# Patient Record
Sex: FEMALE | Race: BLACK OR AFRICAN AMERICAN | NOT HISPANIC OR LATINO | Employment: UNEMPLOYED | URBAN - METROPOLITAN AREA
[De-identification: names, ages, dates, MRNs, and addresses within clinical notes are randomized per-mention and may not be internally consistent; named-entity substitution may affect disease eponyms.]

---
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
## 2020-03-23 ENCOUNTER — DOCUMENTATION (OUTPATIENT)
Dept: PHYSICAL THERAPY | Facility: HOSPITAL | Age: 20
End: 2020-03-23

## 2020-03-23 DIAGNOSIS — G89.29 CHRONIC PAIN OF RIGHT KNEE: Primary | ICD-10-CM

## 2020-03-23 DIAGNOSIS — S83.8X1S SPRAIN OF OTHER LIGAMENT OF RIGHT KNEE, SEQUELA: ICD-10-CM

## 2020-03-23 DIAGNOSIS — M25.561 CHRONIC PAIN OF RIGHT KNEE: Primary | ICD-10-CM

## 2020-03-23 NOTE — THERAPY DISCHARGE NOTE
Outpatient Physical Therapy Discharge Summary         Patient Name: Hieu Ibarra  : 2000  MRN: [id number]    Today's Date: 3/23/2020    Visit Dx:    ICD-10-CM ICD-9-CM   1. Chronic pain of right knee M25.561 719.46    G89.29 338.29   2. Sprain of other ligament of right knee, sequela S83.8X1S 905.7       PT OP Goals     Row Name 20 0900          PT Short Term Goals    STG Date to Achieve  19  -GJ     STG 1  Pt will demonstrate equal B heel strike using B axillary crutches with appropriate posture.  -GJ     STG 1 Progress  Met  -GJ     STG 2  Pt will tolerate quad set x 10 with appropriate quadriceps activation.  -GJ     STG 2 Progress  Met  -GJ     STG 3  Pt will demonstrate improved AROM R knee extension from lacking 50 deg to lacking 35 deg or better.  -     STG 3 Progress  Met  -GJ        Long Term Goals    LTG Date to Achieve  19  -GJ     LTG 1  Pt will demonstrate improved AROM of R knee from lacking 50 deg to lacking 10 deg or better.  -GJ     LTG 1 Progress  Met  -GJ     LTG 2  Pt will demonstrate ability to perform SAQ without inc pain.  -GJ     LTG 2 Progress  Met  -GJ     LTG 3  Pt will demonstrate normalized gait pattern with single axillary crutch or independently.  -     LTG 3 Progress  Met  -GJ     LTG 4  Pt demonstrates understanding with advanced HEP to allow her to continue strengthening as she leaves for college.  -     LTG 4 Progress  Met  -GJ       User Key  (r) = Recorded By, (t) = Taken By, (c) = Cosigned By    Initials Name Provider Type    Tony Orourke, PT Physical Therapist          OP PT Discharge Summary  Date of Discharge: 20  Reason for Discharge: All goals achieved, Independent  Outcomes Achieved: Able to achieve all goals within established timeline  Discharge Destination: Home with home program  Discharge Instructions/Additional Comments: Ms. Ibarra attended 7 sessions of physical therapy from 2019-2019 for her R knee pain.   She reports very low pain levels, continues with difficulty standing from a deep squat.  She is independent with her HEP, has met all of her functional goals and verbalizes a good understanding of her condition.  Ms. Ibarra is discharged from outpatient physical therapy to an independent program.       Time Calculation:                    Tony Leroy, PT  3/23/2020

## 2020-07-22 ENCOUNTER — OFFICE (OUTPATIENT)
Dept: URBAN - METROPOLITAN AREA CLINIC 75 | Facility: CLINIC | Age: 20
End: 2020-07-22
Payer: COMMERCIAL

## 2020-07-22 VITALS — HEIGHT: 63 IN | WEIGHT: 180 LBS

## 2020-07-22 DIAGNOSIS — K21.9 GASTRO-ESOPHAGEAL REFLUX DISEASE WITHOUT ESOPHAGITIS: ICD-10-CM

## 2020-07-22 PROCEDURE — 99204 OFFICE O/P NEW MOD 45 MIN: CPT | Performed by: INTERNAL MEDICINE

## 2020-07-22 RX ORDER — OMEPRAZOLE 20 MG/1
CAPSULE, DELAYED RELEASE ORAL
Qty: 60 | Refills: 3 | Status: ACTIVE

## 2020-10-22 ENCOUNTER — OFFICE (OUTPATIENT)
Dept: URBAN - METROPOLITAN AREA CLINIC 75 | Facility: CLINIC | Age: 20
End: 2020-10-22
Payer: COMMERCIAL

## 2020-10-22 VITALS — HEIGHT: 63 IN | WEIGHT: 185 LBS

## 2020-10-22 DIAGNOSIS — K21.9 GASTRO-ESOPHAGEAL REFLUX DISEASE WITHOUT ESOPHAGITIS: ICD-10-CM

## 2020-10-22 PROCEDURE — 99213 OFFICE O/P EST LOW 20 MIN: CPT | Performed by: NURSE PRACTITIONER

## 2020-10-22 RX ORDER — OMEPRAZOLE 20 MG/1
CAPSULE, DELAYED RELEASE ORAL
Qty: 60 | Refills: 3 | Status: ACTIVE

## 2022-10-20 ENCOUNTER — APPOINTMENT (OUTPATIENT)
Dept: GENERAL RADIOLOGY | Facility: HOSPITAL | Age: 22
End: 2022-10-20

## 2022-10-20 PROCEDURE — 74018 RADEX ABDOMEN 1 VIEW: CPT | Performed by: FAMILY MEDICINE

## 2023-03-19 ENCOUNTER — APPOINTMENT (OUTPATIENT)
Dept: GENERAL RADIOLOGY | Facility: HOSPITAL | Age: 23
End: 2023-03-19
Payer: COMMERCIAL

## 2023-03-19 ENCOUNTER — HOSPITAL ENCOUNTER (EMERGENCY)
Facility: HOSPITAL | Age: 23
Discharge: HOME | End: 2023-03-20
Attending: EMERGENCY MEDICINE | Admitting: EMERGENCY MEDICINE
Payer: COMMERCIAL

## 2023-03-19 ENCOUNTER — APPOINTMENT (OUTPATIENT)
Dept: CT IMAGING | Facility: HOSPITAL | Age: 23
End: 2023-03-19
Payer: COMMERCIAL

## 2023-03-19 DIAGNOSIS — S29.011A MUSCLE STRAIN OF CHEST WALL, INITIAL ENCOUNTER: ICD-10-CM

## 2023-03-19 DIAGNOSIS — S16.1XXA STRAIN OF NECK MUSCLE, INITIAL ENCOUNTER: ICD-10-CM

## 2023-03-19 DIAGNOSIS — S39.012A BACK STRAIN, INITIAL ENCOUNTER: ICD-10-CM
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
## 2023-03-19 LAB — B-HCG UR QL: NEGATIVE

## 2023-03-19 PROCEDURE — 72110 X-RAY EXAM L-2 SPINE 4/>VWS: CPT

## 2023-03-19 PROCEDURE — 72125 CT NECK SPINE W/O DYE: CPT

## 2023-03-19 PROCEDURE — 71046 X-RAY EXAM CHEST 2 VIEWS: CPT

## 2023-03-19 PROCEDURE — 99283 EMERGENCY DEPT VISIT LOW MDM: CPT

## 2023-03-19 PROCEDURE — 81025 URINE PREGNANCY TEST: CPT

## 2023-03-20 VITALS
RESPIRATION RATE: 18 BRPM | HEIGHT: 62 IN | WEIGHT: 170 LBS | DIASTOLIC BLOOD PRESSURE: 69 MMHG | BODY MASS INDEX: 31.28 KG/M2 | TEMPERATURE: 97.6 F | HEART RATE: 72 BPM | OXYGEN SATURATION: 98 % | SYSTOLIC BLOOD PRESSURE: 142 MMHG

## 2023-03-20 RX ORDER — NAPROXEN 500 MG/1
500 TABLET ORAL 2 TIMES DAILY PRN
Qty: 20 TABLET | Refills: 0 | Status: SHIPPED | OUTPATIENT
Start: 2023-03-20

## 2023-03-20 RX ORDER — METHYLPREDNISOLONE 4 MG/1
TABLET ORAL
Qty: 21 TABLET | Refills: 0 | Status: SHIPPED | OUTPATIENT
Start: 2023-03-20

## 2023-03-20 RX ORDER — HYDROCODONE BITARTRATE AND ACETAMINOPHEN 5; 325 MG/1; MG/1
1 TABLET ORAL EVERY 6 HOURS PRN
Status: DISCONTINUED | OUTPATIENT
Start: 2023-03-20 | End: 2023-03-20 | Stop reason: HOSPADM

## 2023-03-20 RX ADMIN — HYDROCODONE BITARTRATE AND ACETAMINOPHEN 1 TABLET: 5; 325 TABLET ORAL at 00:12
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
